# Patient Record
(demographics unavailable — no encounter records)

---

## 2024-12-13 NOTE — HISTORY OF PRESENT ILLNESS
[FreeTextEntry8] : 35 y/o M with PMHx of HTN and prediabetes who presents to clinic for abdominal pain.  Patient last seen for CPE in Jan 2024. Since that time has been doing well, but notes for the past few days has been with intermittent lower abdominal pain. Patient was also with symptoms of what he thought was a UTI; presented to urgent care with urine studies unremarkable. Notes that the pain is daily, and constant when present. Also with associated gas pain and infrequent rectal pain. Denies current pain or burning on urination. Denies trialing new medications or changes to diet.  Of note, patient was noted to have A1c of 7.5% at last visit in Jan. Trialed Metformin but was unable to tolerate side effects of nausea and diarrhea. Since that appt has tried to make health lifestyle and dietary modifications, including exercise and cutting fatty foods out of his diet. Notes his home fingerstick readings have consistently been in the 110-120s.

## 2024-12-13 NOTE — HEALTH RISK ASSESSMENT
[No] : In the past 12 months have you used drugs other than those required for medical reasons? No [No falls in past year] : Patient reported no falls in the past year [Other reason not done] : Other reason not done [de-identified] : Screened in [facility] [Never] : Never

## 2024-12-13 NOTE — HISTORY OF PRESENT ILLNESS
[FreeTextEntry8] : 37 y/o M with PMHx of HTN and prediabetes who presents to clinic for abdominal pain.  Patient last seen for CPE in Jan 2024. Since that time has been doing well, but notes for the past few days has been with intermittent lower abdominal pain. Patient was also with symptoms of what he thought was a UTI; presented to urgent care with urine studies unremarkable. Notes that the pain is daily, and constant when present. Also with associated gas pain and infrequent rectal pain. Denies current pain or burning on urination. Denies trialing new medications or changes to diet.  Of note, patient was noted to have A1c of 7.5% at last visit in Jan. Trialed Metformin but was unable to tolerate side effects of nausea and diarrhea. Since that appt has tried to make health lifestyle and dietary modifications, including exercise and cutting fatty foods out of his diet. Notes his home fingerstick readings have consistently been in the 110-120s.

## 2024-12-13 NOTE — PHYSICAL EXAM
[No Acute Distress] : no acute distress [Well Nourished] : well nourished [Well Developed] : well developed [Well-Appearing] : well-appearing [Normal Sclera/Conjunctiva] : normal sclera/conjunctiva [PERRL] : pupils equal round and reactive to light [EOMI] : extraocular movements intact [Normal Outer Ear/Nose] : the outer ears and nose were normal in appearance [Normal Oropharynx] : the oropharynx was normal [No JVD] : no jugular venous distention [No Lymphadenopathy] : no lymphadenopathy [Supple] : supple [Thyroid Normal, No Nodules] : the thyroid was normal and there were no nodules present [No Respiratory Distress] : no respiratory distress  [No Accessory Muscle Use] : no accessory muscle use [Clear to Auscultation] : lungs were clear to auscultation bilaterally [Normal Rate] : normal rate  [Regular Rhythm] : with a regular rhythm [Normal S1, S2] : normal S1 and S2 [No Murmur] : no murmur heard [No Carotid Bruits] : no carotid bruits [No Abdominal Bruit] : a ~M bruit was not heard ~T in the abdomen [No Varicosities] : no varicosities [Pedal Pulses Present] : the pedal pulses are present [No Edema] : there was no peripheral edema [No Palpable Aorta] : no palpable aorta [No Extremity Clubbing/Cyanosis] : no extremity clubbing/cyanosis [Soft] : abdomen soft [Non Tender] : non-tender [Non-distended] : non-distended [No Masses] : no abdominal mass palpated [No HSM] : no HSM [Normal Bowel Sounds] : normal bowel sounds [Normal Posterior Cervical Nodes] : no posterior cervical lymphadenopathy [Normal Anterior Cervical Nodes] : no anterior cervical lymphadenopathy [No CVA Tenderness] : no CVA  tenderness [No Spinal Tenderness] : no spinal tenderness [No Joint Swelling] : no joint swelling [Grossly Normal Strength/Tone] : grossly normal strength/tone [No Rash] : no rash [Coordination Grossly Intact] : coordination grossly intact [No Focal Deficits] : no focal deficits [Normal Gait] : normal gait [Deep Tendon Reflexes (DTR)] : deep tendon reflexes were 2+ and symmetric [Normal Affect] : the affect was normal [Normal Insight/Judgement] : insight and judgment were intact [de-identified] : Mildly TTP in lower abd quadrants.

## 2024-12-13 NOTE — PHYSICAL EXAM
[No Acute Distress] : no acute distress [Well Nourished] : well nourished [Well Developed] : well developed [Well-Appearing] : well-appearing [Normal Sclera/Conjunctiva] : normal sclera/conjunctiva [PERRL] : pupils equal round and reactive to light [EOMI] : extraocular movements intact [Normal Outer Ear/Nose] : the outer ears and nose were normal in appearance [Normal Oropharynx] : the oropharynx was normal [No JVD] : no jugular venous distention [No Lymphadenopathy] : no lymphadenopathy [Supple] : supple [Thyroid Normal, No Nodules] : the thyroid was normal and there were no nodules present [No Respiratory Distress] : no respiratory distress  [No Accessory Muscle Use] : no accessory muscle use [Clear to Auscultation] : lungs were clear to auscultation bilaterally [Normal Rate] : normal rate  [Regular Rhythm] : with a regular rhythm [Normal S1, S2] : normal S1 and S2 [No Murmur] : no murmur heard [No Carotid Bruits] : no carotid bruits [No Abdominal Bruit] : a ~M bruit was not heard ~T in the abdomen [No Varicosities] : no varicosities [Pedal Pulses Present] : the pedal pulses are present [No Edema] : there was no peripheral edema [No Palpable Aorta] : no palpable aorta [Soft] : abdomen soft [No Extremity Clubbing/Cyanosis] : no extremity clubbing/cyanosis [Non Tender] : non-tender [Non-distended] : non-distended [No Masses] : no abdominal mass palpated [No HSM] : no HSM [Normal Bowel Sounds] : normal bowel sounds [Normal Posterior Cervical Nodes] : no posterior cervical lymphadenopathy [Normal Anterior Cervical Nodes] : no anterior cervical lymphadenopathy [No CVA Tenderness] : no CVA  tenderness [No Spinal Tenderness] : no spinal tenderness [No Joint Swelling] : no joint swelling [Grossly Normal Strength/Tone] : grossly normal strength/tone [No Rash] : no rash [Coordination Grossly Intact] : coordination grossly intact [No Focal Deficits] : no focal deficits [Normal Gait] : normal gait [Deep Tendon Reflexes (DTR)] : deep tendon reflexes were 2+ and symmetric [Normal Affect] : the affect was normal [Normal Insight/Judgement] : insight and judgment were intact [de-identified] : Mildly TTP in lower abd quadrants.

## 2024-12-13 NOTE — INTERPRETER SERVICES
[Patient Declined  Services] : - None: Patient declined  services [TWNoteComboBox1] : [place]

## 2024-12-13 NOTE — ASSESSMENT
[FreeTextEntry1] : 35 y/o M with PMHx of HTN and prediabetes who presents to clinic for abdominal pain. With pain and mild tenderness in lower abd quadrants along with associated feelings of dysuria. POC UA with mild proteinuria, will send for UPCr and albumin/Cr ratio along with renal US. Recheck A1c and lipid panel today.

## 2024-12-13 NOTE — HEALTH RISK ASSESSMENT
[No] : In the past 12 months have you used drugs other than those required for medical reasons? No [No falls in past year] : Patient reported no falls in the past year [Other reason not done] : Other reason not done [de-identified] : Screened in [facility] [Never] : Never

## 2025-01-03 NOTE — ASSESSMENT
[FreeTextEntry1] : 37 [place] speaking male with hx of HTN, pre-DM presenting as a new patient for a 1-month history of burning at the end of urination and occasional suprapubic pain. Has not tried anything for pain. Will send U/A, Urine culture. Deferred  exam today. PVR today 54 cc. Discussed managing with conservative measures and returning in 1 month for symptom check.   Plan:  - U/A, Urine culture, GC/Chlamydia sent.  - Sitz [place] - NSAIDs - Avoid constipation - F/u in 1 month for symptom check. Can consider JAMEL at next visit if symptoms persist.

## 2025-01-03 NOTE — HISTORY OF PRESENT ILLNESS
[FreeTextEntry1] : 37 Occitan speaking male with hx of HTN, pre-DM presenting as a new patient for a 1-month history of burning at the end of urination and occasional suprapubic pain. Reports pain is a level 6/10. Has not tried anything for pain.  Denies previous history of UTIs, difficulty urinating, incomplete bladder emptying, previous urological history, no STD hx, gross hematuria, family  history, fevers, chills, nausea, vomiting.  RBUS with PCP on 12/13 was normal. U/A was negative.    [id number]

## 2025-01-03 NOTE — PHYSICAL EXAM
[General Appearance - Well Developed] : well developed [General Appearance - Well Nourished] : well nourished [] : no respiratory distress [Abdomen Soft] : soft [Abdomen Tenderness] : non-tender [Normal Station and Gait] : the gait and station were normal for the patient's age [Not Anxious] : not anxious [Costovertebral Angle Tenderness] : no ~M costovertebral angle tenderness [de-identified] : PVR 54 cc

## 2025-03-19 NOTE — HISTORY OF PRESENT ILLNESS
[FreeTextEntry1] : 37-year-old male with history of DM (diagnosed in Jan 2024, currently diet controlled) presents to clinic for initial evaluation of proteinuria.   Kidney History: On review of previous labs, Scr was WNL at 0.92 on 1/9/15. Pt. was diagnosed with DM in January 2024 (A1c was elevated at 7.5 on 1/13/24). Pt. was initially placed on Metformin, currently DM is diet controlled. Last Scr remained stable/WNL at 0.82 on 12/11/24. However, UACR was elevated at 480 and UPCR was elevated at 0.8 on 12/11/24. HBsAg and Hep C core Ab done previously on 1/13/24 were non-reactive. Pt. initiated on Losartan 25 mg once daily by his PCP in December 2024. Kidney US done on 12/13/24 showed no renal masses, hydronephrosis, or calculi. Pt. denied previous history of kidney disease or kidney stones. Pt. with family history of CKD/ESRD. Pt.'s father had ESRD due to DM (before he passed) and his mother has diabetic kidney disease. No recent NSAIDs use.   Pt. currently feels well, gives history of chronic lower abdominal/pelvic discomfort and foamy urine. Pt. denies CP, SOB, LE edema, dysuria or gross hematuria.

## 2025-03-19 NOTE — PHYSICAL EXAM
[General Appearance - Alert] : alert [General Appearance - In No Acute Distress] : in no acute distress [General Appearance - Well Nourished] : well nourished [General Appearance - Well Developed] : well developed [Sclera] : the sclera and conjunctiva were normal [Extraocular Movements] : extraocular movements were intact [Outer Ear] : the ears and nose were normal in appearance [Neck Appearance] : the appearance of the neck was normal [Jugular Venous Distention Increased] : there was no jugular-venous distention [Auscultation Breath Sounds / Voice Sounds] : lungs were clear to auscultation bilaterally [Heart Sounds] : normal S1 and S2 [Edema] : there was no peripheral edema [Abdomen Tenderness] : non-tender [No CVA Tenderness] : no ~M costovertebral angle tenderness [Musculoskeletal - Swelling] : no joint swelling seen [] : no rash [Oriented To Time, Place, And Person] : oriented to person, place, and time [Respiration, Rhythm And Depth] : normal respiratory rhythm and effort [Exaggerated Use Of Accessory Muscles For Inspiration] : no accessory muscle use [Heart Sounds Gallop] : no gallops [Murmurs] : no murmurs [Heart Sounds Pericardial Friction Rub] : no pericardial rub [Abdomen Soft] : soft [Nail Clubbing] : no clubbing  or cyanosis of the fingernails [Affect] : the affect was normal [Mood] : the mood was normal

## 2025-03-19 NOTE — END OF VISIT
[FreeTextEntry3] : I was physically present for the key portions of the evaluation and management (E/M) service provided. I agree with the above history, ROS, physical exam, assessment and plan which I have reviewed and edited where appropriate. I have also reviewed the assessment and management of proteinuria with the patient during clinic visit today.   Pt. with proteinuria in setting of DM. UACR was elevated at 480 and UPCR was elevated at 0.8 on 12/11/24. Last Scr WNL at 0.82 on 12/11/24. Pt. currently on ARN (Losartan) therapy. Check labs today (as outlined above). Monitor BP and labs. Avoid NSAIDs/nephrotoxins.

## 2025-03-19 NOTE — ASSESSMENT
[FreeTextEntry1] : 1. Proteinuria: Pt. with albuminuria/proteinuria in setting of DM. Exact duration of proteinuria however unknown. On review of previous labs, Scr was WNL at 0.92 on 1/9/15. Last Scr remained stable/WNL at 0.82 on 12/11/24. However, UACR was elevated at 480 and UPCR was elevated at 0.8 on 12/11/24. HBsAg and Hep C core Ab done previously on 1/13/24 were non-reactive. Kidney US done on 12/13/24 showed no renal masses, hydronephrosis, or calculi. Pt. initiated on ARB (Losartan) therapy by his PCP in December 2024. Check renal panel, UACR, UPCR and serum LENCHO today. BP stable during clinic visit today. Importance of good glycemic and BP control reviewed with patient today. Avoid NSAIDs/nephrotoxins.   RTC: 6 months

## 2025-03-19 NOTE — REVIEW OF SYSTEMS
[Abdominal Pain] : abdominal pain [Anxiety] : no anxiety [As Noted in HPI] : as noted in HPI [Negative] : Psychiatric [Fever] : no fever [Chills] : no chills [Eyesight Problems] : no eyesight problems [Nosebleeds] : no nosebleeds [Sore Throat] : no sore throat [Chest Pain] : no chest pain [Lower Ext Edema] : no extremity edema [Shortness Of Breath] : no shortness of breath [Cough] : no cough [Vomiting] : no vomiting [Diarrhea] : no diarrhea [Dysuria] : no dysuria [Joint Swelling] : no joint swelling [Joint Stiffness] : no joint stiffness [Skin Lesions] : no skin lesions [Confused] : no confusion [Dizziness] : no dizziness [Muscle Weakness] : no muscle weakness [Easy Bleeding] : no tendency for easy bleeding [Easy Bruising] : no tendency for easy bruising [FreeTextEntry7] : chronic lower abdominal/pelvic pain, seen by urologist in Jan 2025.  [FreeTextEntry8] : foamy urine

## 2025-03-30 NOTE — HEALTH RISK ASSESSMENT
[Audit-CScore] : 0 [[id number]] : 0 [Change in mental status noted] : No change in mental status noted [Language] : denies difficulty with language [Behavior] : denies difficulty with behavior [Learning/Retaining New Information] : denies difficulty learning/retaining new information [Handling Complex Tasks] : denies difficulty handling complex tasks [Reasoning] : denies difficulty with reasoning [Spatial Ability and Orientation] : denies difficulty with spatial ability and orientation [High Risk Behavior] : no high risk behavior [Reports changes in hearing] : Reports no changes in hearing [Reports changes in vision] : Reports no changes in vision [Reports changes in dental health] : Reports no changes in dental health

## 2025-03-30 NOTE — HEALTH RISK ASSESSMENT
[Audit-CScore] : 0 [[id number]] : 0 [Change in mental status noted] : No change in mental status noted [Language] : denies difficulty with language [Behavior] : denies difficulty with behavior [Learning/Retaining New Information] : denies difficulty learning/retaining new information [Handling Complex Tasks] : denies difficulty handling complex tasks [Reasoning] : denies difficulty with reasoning [Spatial Ability and Orientation] : denies difficulty with spatial ability and orientation [High Risk Behavior] : no high risk behavior [Reports changes in hearing] : Reports no changes in hearing [Reports changes in vision] : Reports no changes in vision [Reports changes in dental health] : Reports no changes in dental health

## 2025-03-30 NOTE — HISTORY OF PRESENT ILLNESS
[FreeTextEntry1] : CPE [de-identified] : 38 y/o M with PMHx of HTN and prediabetes presenting for CPE.  Patient last seen for appt in Dec 2024 where he was noted to have abdominal pain and GERD-like symptoms. Trialed PPI and reports symptom resolution, although more recently has been bothered by gas pain. Reports episodes where he cannot pass gas for hours and has associated abdominal cramping and belly pain. Reports pain seems to be worse at night. Was prompted to present to ED in early Feb for episode of particularly severe pain; underwent full workup including CT imaging which was unremarkable.

## 2025-03-30 NOTE — ASSESSMENT
[FreeTextEntry1] : 36 y/o M with PMHx of HTN and prediabetes presenting for CPE. Recently presented to ED with gas pain, unremarkable workup including CT imaging, will trial simethicone for gas pain. Persistently HTN but has not started increased 50mg losartan dose, will  new med today.

## 2025-03-30 NOTE — HISTORY OF PRESENT ILLNESS
[FreeTextEntry1] : CPE [de-identified] : 36 y/o M with PMHx of HTN and prediabetes presenting for CPE.  Patient last seen for appt in Dec 2024 where he was noted to have abdominal pain and GERD-like symptoms. Trialed PPI and reports symptom resolution, although more recently has been bothered by gas pain. Reports episodes where he cannot pass gas for hours and has associated abdominal cramping and belly pain. Reports pain seems to be worse at night. Was prompted to present to ED in early Feb for episode of particularly severe pain; underwent full workup including CT imaging which was unremarkable.

## 2025-03-30 NOTE — REVIEW OF SYSTEMS
[Abdominal Pain] : abdominal pain [Negative] : Heme/Lymph [Nausea] : no nausea [Constipation] : no constipation [Diarrhea] : no diarrhea [Vomiting] : no vomiting [Heartburn] : no heartburn [Melena] : no melena [FreeTextEntry7] : Gas pain, periods of not passing gas.

## 2025-03-30 NOTE — HEALTH RISK ASSESSMENT
[Audit-CScore] : 0 [[id number]] : 0 [Change in mental status noted] : No change in mental status noted [Language] : denies difficulty with language [Behavior] : denies difficulty with behavior [Learning/Retaining New Information] : denies difficulty learning/retaining new information [Handling Complex Tasks] : denies difficulty handling complex tasks [Reasoning] : denies difficulty with reasoning [Spatial Ability and Orientation] : denies difficulty with spatial ability and orientation [High Risk Behavior] : no high risk behavior [Reports changes in hearing] : Reports no changes in hearing [Reports changes in vision] : Reports no changes in vision [Reports changes in dental health] : Reports no changes in dental health

## 2025-03-30 NOTE — HISTORY OF PRESENT ILLNESS
[FreeTextEntry1] : CPE [de-identified] : 36 y/o M with PMHx of HTN and prediabetes presenting for CPE.  Patient last seen for appt in Dec 2024 where he was noted to have abdominal pain and GERD-like symptoms. Trialed PPI and reports symptom resolution, although more recently has been bothered by gas pain. Reports episodes where he cannot pass gas for hours and has associated abdominal cramping and belly pain. Reports pain seems to be worse at night. Was prompted to present to ED in early Feb for episode of particularly severe pain; underwent full workup including CT imaging which was unremarkable.